# Patient Record
(demographics unavailable — no encounter records)

---

## 2025-04-06 NOTE — REVIEW OF SYSTEMS
[TextEntry] : CONSTITUTIONAL: denies fever, chills, fatigue, weakness  HEENT: denies blurred vision, sore throat  SKIN: denies new lesions, rash CARDIOVASCULAR: chest tightness, palpitations. chest pain  RESPIRATORY: denies shortness of breath, cough, sputum production  GASTROINTESTINAL: denies nausea, vomiting, diarrhea, abdominal pain, melena or hematochezia  GENITOURINARY: denies dysuria, discharge  NEUROLOGICAL: headache. denies numbness, focal weakness  MUSCULOSKELETAL: denies new joint pain, muscle aches  HEMATOLOGIC: denies gross bleeding, bruising

## 2025-04-06 NOTE — HISTORY OF PRESENT ILLNESS
[FreeTextEntry8] : Patient is a 57-year-old female who presents to [facility]. She has PMH of HTN, HLD, GERD, alcohol use disorder, and sciatica. She was recently seen in [facility] ED for symptoms of headache, chest tightness, and palpitations likely due to HTN. Patient was found to have uncontrolled HTN and transaminitis. Workup for MI was negative.  Patient reports she was not taking home Olmesartan 40mg qd for 2 weeks prior to ED visit. Patient was discharged on home Olmesartan. Patient stills reports headaches, chest tightness and palpitations since being on Olmesartan. Patient still reports elevated BP since ED visit ranges from 162/95 - 200/95. Stopped alcohol use 27 days ago, was drinking 2-3 drinks (wine and mixed drinks) for the past 3 years. Denies fever, chills, SOB, abdominal pain, nausea, vomiting, diarrhea, constipation, or focal deficits.

## 2025-04-06 NOTE — PHYSICAL EXAM
[TextEntry] : GENERAL: patient appears well, no acute distress, appropriately interactive  EYES: sclera clear, no exudates  ENMT: oropharynx clear without erythema, no exudates, moist mucous membranes NECK: supple, soft, submandibular fullness nontender to palpation  LUNGS: good air entry bilaterally, clear to auscultation, symmetric breath sounds, no wheezing or rhonchi appreciated  HEART: soft S1/S2, regular rate and rhythm, no murmurs noted, no lower extremity edema  GASTROINTESTINAL: abdomen is soft, nontender, nondistended, normoactive bowel sounds  INTEGUMENT: good skin turgor, warm skin, appears well perfused  MUSCULOSKELETAL: no clubbing or cyanosis, no obvious deformity  NEUROLOGIC: awake, alert, oriented x3, good muscle tone in all 4 extremities, no obvious sensory deficits PSYCHIATRIC: appropriate mood and affect  HEME/LYMPH: no obvious ecchymosis or petechiae

## 2025-04-06 NOTE — PLAN
[FreeTextEntry1] :  Patient is a 57-year-old female who presents to establish care. She was recently evaluated in [place] ED for hypertension.  #Health care maintenance - Obtain routines labs including a1c, TSH, lipid panel - Ordered mammogram - Tdap administered  #HTN: - Uncontrolled on current regime - Lifestyle modification advised: DASH diet, low sodium, regular exercise, weight loss, limit alcohol - Monitor BPs at home; keep log - Labs: CMP, lipid panel, A1c, TSH/T4 - continue Olmesartan 40 mg daily - Added amlodipine 10 mg daily - Follow up in 2 weeks for BP check - Plans to establish care with cardiologist Dr. Owen   #Transaminitis: - as per recent lab work during ED visit 5 days ago - likely due to prior alcohol use and uncontrolled HTN - Obtain CMP - if LFTS do not improved, will consider RUQ US  #HLD: - Obtain lipid panel  - continue atorvastatin 20 mg daily  #GERD: - Continue pantoprazole  #Alcohol use, in remission - Continued cessation encouraged  #BMI 34 - Diet: Encouraged a balanced diet with portion control, emphasizing whole foods, lean proteins, fruits, and vegetables while minimizing processed foods.  - Exercise: Recommended at least 150 minutes of moderate intensity aerobic exercise per week, along with strength training exercises at least twice weekly

## 2025-04-06 NOTE — HISTORY OF PRESENT ILLNESS
[FreeTextEntry8] : Patient is a 57-year-old female who presents to [facility]. She has PMH of HTN, HLD, GERD, alcohol use disorder, and sciatica. She was recently seen in [facility] ED for symptoms of headache, chest tightness, and palpitations likely due to HTN. Patient was found to have uncontrolled HTN and transaminitis. Workup for MI was negative.  Patient reports she was not taking home Olmesartan 40mg qd for 2 weeks prior to ED visit. Patient was discharged on home Olmesartan. Patient stills reports headaches, chest tightness and palpitations since being on Olmesartan. Patient still reports elevated BP since ED visit ranges from 162/95 - 200/95. Stopped alcohol use 27 days ago, was drinking 2-3 drinks (wine and mixed drinks) for the past 3 years. Denies fever, chills, SOB, abdominal pain, nausea, vomiting, diarrhea, constipation, or focal deficits.

## 2025-04-06 NOTE — PLAN
[FreeTextEntry1] :  Patient is a 57-year-old female who presents to establish care. She was recently evaluated in [place] ED for hypertension.  #Health care maintenance - Obtain routines labs including a1c, TSH, lipid panel - Ordered mammogram - Tdap administered  #HTN: - Uncontrolled on current regime - Lifestyle modification advised: DASH diet, low sodium, regular exercise, weight loss, limit alcohol - Monitor BPs at home; keep log - Labs: CMP, lipid panel, A1c, TSH/T4 - continue Olmesartan 40 mg daily - Added amlodipine 10 mg daily - Follow up in 2 weeks for BP check - Plans to establish care with cardiologist Dr. Owen   #Transaminitis: - as per recent lab work during ED visit 5 days ago - likely due to prior alcohol use and uncontrolled HTN - Obtain CMP - if LFTS do not improved, will consider RUQ US  #HLD: - Obtain lipid panel  - continue atorvastatin 20 mg daily  #GERD: - Continue pantoprazole  #Alcohol use, in remission - Continued cessation encouraged  #BMI 34 - Diet: Encouraged a balanced diet with portion control, emphasizing whole foods, lean proteins, fruits, and vegetables while minimizing processed foods.  - Exercise: Recommended at least 150 minutes of moderate intensity aerobic exercise per week, along with strength training exercises at least twice weekly